# Patient Record
Sex: MALE | Race: WHITE | NOT HISPANIC OR LATINO | Employment: FULL TIME | URBAN - NONMETROPOLITAN AREA
[De-identification: names, ages, dates, MRNs, and addresses within clinical notes are randomized per-mention and may not be internally consistent; named-entity substitution may affect disease eponyms.]

---

## 2020-07-20 ENCOUNTER — TRANSCRIBE ORDERS (OUTPATIENT)
Dept: ORTHOPEDIC SURGERY | Facility: CLINIC | Age: 40
End: 2020-07-20

## 2020-07-20 DIAGNOSIS — G56.01 RIGHT CARPAL TUNNEL SYNDROME: Primary | ICD-10-CM

## 2020-07-20 DIAGNOSIS — S86.912A KNEE STRAIN, LEFT, INITIAL ENCOUNTER: Primary | ICD-10-CM

## 2020-07-22 ENCOUNTER — OFFICE VISIT (OUTPATIENT)
Dept: ORTHOPEDIC SURGERY | Facility: CLINIC | Age: 40
End: 2020-07-22

## 2020-07-22 VITALS
HEART RATE: 92 BPM | WEIGHT: 258 LBS | OXYGEN SATURATION: 98 % | DIASTOLIC BLOOD PRESSURE: 82 MMHG | HEIGHT: 69 IN | BODY MASS INDEX: 38.21 KG/M2 | TEMPERATURE: 98.5 F | SYSTOLIC BLOOD PRESSURE: 120 MMHG

## 2020-07-22 DIAGNOSIS — M25.531 RIGHT WRIST PAIN: Primary | ICD-10-CM

## 2020-07-22 DIAGNOSIS — G56.21 CUBITAL TUNNEL SYNDROME, RIGHT: ICD-10-CM

## 2020-07-22 DIAGNOSIS — M25.521 ELBOW PAIN, CHRONIC, RIGHT: ICD-10-CM

## 2020-07-22 DIAGNOSIS — G89.29 ELBOW PAIN, CHRONIC, RIGHT: ICD-10-CM

## 2020-07-22 PROCEDURE — 20605 DRAIN/INJ JOINT/BURSA W/O US: CPT | Performed by: ORTHOPAEDIC SURGERY

## 2020-07-22 PROCEDURE — 99204 OFFICE O/P NEW MOD 45 MIN: CPT | Performed by: ORTHOPAEDIC SURGERY

## 2020-07-22 RX ORDER — BUPIVACAINE HYDROCHLORIDE 5 MG/ML
0.5 INJECTION, SOLUTION PERINEURAL
Status: COMPLETED | OUTPATIENT
Start: 2020-07-22 | End: 2020-07-22

## 2020-07-22 RX ORDER — LISINOPRIL 10 MG/1
10 TABLET ORAL EVERY 24 HOURS
COMMUNITY
Start: 2020-02-04

## 2020-07-22 RX ORDER — LIDOCAINE HYDROCHLORIDE AND EPINEPHRINE 10; 10 MG/ML; UG/ML
0.5 INJECTION, SOLUTION INFILTRATION; PERINEURAL
Status: COMPLETED | OUTPATIENT
Start: 2020-07-22 | End: 2020-07-22

## 2020-07-22 RX ORDER — TRIAMCINOLONE ACETONIDE 40 MG/ML
40 INJECTION, SUSPENSION INTRA-ARTICULAR; INTRAMUSCULAR
Status: COMPLETED | OUTPATIENT
Start: 2020-07-22 | End: 2020-07-22

## 2020-07-22 RX ADMIN — LIDOCAINE HYDROCHLORIDE AND EPINEPHRINE 0.5 ML: 10; 10 INJECTION, SOLUTION INFILTRATION; PERINEURAL at 15:02

## 2020-07-22 RX ADMIN — BUPIVACAINE HYDROCHLORIDE 0.5 ML: 5 INJECTION, SOLUTION PERINEURAL at 15:02

## 2020-07-22 RX ADMIN — TRIAMCINOLONE ACETONIDE 40 MG: 40 INJECTION, SUSPENSION INTRA-ARTICULAR; INTRAMUSCULAR at 15:02

## 2020-07-22 NOTE — PROGRESS NOTES
Phil Toussaint is a 40 y.o. male   Primary provider:  Grayson Mccrary APRN       Chief Complaint   Patient presents with   • Right Wrist - Initial Evaluation, Pain       HISTORY OF PRESENT ILLNESS: Patient is being seen for his right wrist. Pain level of 4/10.    This is the first office visit for evaluation of right elbow pain and tingling in the right hand.    Mr. Toussaint is 40 years old and right-hand dominant.  He said he first had problems with his right elbow when he was playing the outfield in high school baseball.  He describes throwing and had an acute popping and snapping sensation in the elbow.  He was evaluated at the time and he thinks he tore a ligament in his elbow.  He said that 1 doctor he saw thought he needed Josh Walter surgery.  He immobilize his elbow for short time but then resumed his athletics against the advice of his physician.  It sounds like he has had intermittent problems with the elbow since then.  There is been no further trauma.  Complains of aching discomfort over the medial aspect of the elbow with occasional extension to the hand.  He has had intermittent numbness and tingling of the small ring and middle fingers.  Initially his symptoms were present only when throwing but now he says symptoms are fairly constant nature.  His pain is worse with motion of the elbow and use of the extremity.  He has had questionable relief with Tylenol.  He said he had an MRI scan of his elbow 5 years ago and there was some discussion about possible ulnar collateral ligament reconstruction.  However he was told he was too old to have the surgery done.  Recent treatment has consisted primarily of activity restriction.    Current medications include lisinopril.  He has no allergies.  He does not smoke.  Past medical history is remarkable for kidney stones.  He is employed as a .  He is .    JOLYNN Aleman has asked that I see him for evaluation and  "treatment.          Pain   The current episode started more than 1 year ago. Associated symptoms comments: Stabbing and burning .        CONCURRENT MEDICAL HISTORY:    Past Medical History:   Diagnosis Date   • Sleep apnea        No Known Allergies      Current Outpatient Medications:   •  lisinopril (PRINIVIL,ZESTRIL) 10 MG tablet, Take 10 mg by mouth Daily., Disp: , Rfl:     No past surgical history on file.    Family History   Problem Relation Age of Onset   • Hypertension Other    • Cancer Other         Social History     Socioeconomic History   • Marital status:      Spouse name: Not on file   • Number of children: Not on file   • Years of education: Not on file   • Highest education level: Not on file   Tobacco Use   • Smoking status: Never Smoker   • Smokeless tobacco: Never Used   Substance and Sexual Activity   • Alcohol use: Never     Frequency: Never   • Drug use: Never   • Sexual activity: Defer        Review of Systems   Constitutional: Negative.    HENT: Negative.    Eyes: Negative.    Respiratory: Negative.    Cardiovascular: Negative.    Gastrointestinal: Negative.    Endocrine: Negative.    Genitourinary: Negative.    Musculoskeletal: Negative.    Skin: Negative.    Allergic/Immunologic: Negative.    Neurological: Negative.    Hematological: Negative.    Psychiatric/Behavioral: Negative.      ReView of systems is positive as noted above.  PHYSICAL EXAMINATION:   In general he is healthy-appearing alert pleasant and in no apparent distress.  He responds appropriately to questions and commands.    Vitals:    07/22/20 1407   BP: 120/82   BP Location: Left arm   Patient Position: Sitting   Cuff Size: Adult   Pulse: 92   Temp: 98.5 °F (36.9 °C)   TempSrc: Temporal   SpO2: 98%   Weight: 117 kg (258 lb)   Height: 175.3 cm (69\")   PainSc:   4       Physical Exam    GAIT:     [x]  Normal  []  Antalgic    Assistive device: [x]  None  []  Walker     []  Crutches  []  Cane     []  Wheelchair  []  " Stretcher    Ortho Exam is directed to the upper extremities.  There was no clinical atrophy in the upper extremities.  There are multiple tattoos.  Digital wrist and forearm motions are full on the right.  Motion of the right elbow is smooth but painfully restricted from about 5 to 100 degrees.  Percussion over the ulnar nerve at the elbow produces mild pain but no paresthesias.  There is tenderness diffusely about the elbow most prominent over the medial aspect of the proximal ulna as well as over the olecranon.  There is no palpable abnormality.  He has symmetric laxity on valgus stressing of the elbow.  Radial pulses strong.  Sensory exam shows hypaesthesia soft touch in the small finger and dorsal ulnar aspect of the hand.  There was no split in the ring finger.  Vibratory sensibility is also decreased in the small finger.  Pressure Phalen testing is negative.  Flexion of the elbow produces medial elbow pain but no paresthesias.    MRI scan of the elbow done previously was reviewed.  The study is technically limited.  There is a small area of signal change in the radial head.  The articular cartilage appears normal.  The ulnar collateral ligament appears to be intact.    EMG and nerve conduction study done earlier this month this report is showing mild right carpal tunnel syndrome with normal ulnar nerve function and no evidence of cervical radiculopathy or brachial plexopathy.          Xr Elbow 2 View Right    Result Date: 7/22/2020  Narrative: Ordering Provider:  João Yanes MD Ordering Diagnosis/Indication:  Right wrist pain, Elbow pain, chronic, right Procedure:  XR ELBOW 2 VW RIGHT Exam Date:  7/22/20 COMPARISON: None     Impression:  Radiographs of the right elbow AP and lateral views done today show no significant degenerative change.  The lateral view is technically limited. João Yanes MD 7/22/20      Medium Joint Arthrocentesis: R elbow  Date/Time: 7/22/2020 3:02 PM  Consent given by:  patient  Site marked: site marked  Timeout: Immediately prior to procedure a time out was called to verify the correct patient, procedure, equipment, support staff and site/side marked as required   Supporting Documentation  Indications: pain and joint swelling   Procedure Details  Location: elbow - R elbow  Preparation: Patient was prepped and draped in the usual sterile fashion  Needle size: 22 G  Approach: anterolateral  Medications administered: 0.5 mL lidocaine-EPINEPHrine 1 %-1:480179; 40 mg triamcinolone acetonide 40 MG/ML; 0.5 mL bupivacaine 0.5 %  Patient tolerance: patient tolerated the procedure well with no immediate complications          ASSESSMENT: Pain right elbow and hand of uncertain cause.  His symptoms and exam are suggestive of cubital tunnel syndrome but he has limited clinical findings.  Exam is not consistent with carpal tunnel syndrome.  It is possible that his pain may be related to chronic injury to the elbow possibly with chondral pathology.    The uncertainty of the diagnosis was discussed with him.  Treatment options were reviewed.  The potential benefits of injection of the cubital tunnel were discussed.  He wished to proceed with this.    The right elbow was prepped.  Skin was infiltrated with 1% Xylocaine with epinephrine.  The cubital tunnel was injected just proximal to the medial epicondyle with Xylocaine and Kenalog.  There were no complications.    He may advance activities as tolerated.  I requested follow-up here in 1 month for clinical exam.  If his symptoms do not respond to the above measures injection of the elbow joint may be an option.        Diagnoses and all orders for this visit:    Right wrist pain  -     XR Elbow 2 View Right  -     Medium Joint Arthrocentesis: R elbow    Elbow pain, chronic, right  -     XR Elbow 2 View Right  -     Medium Joint Arthrocentesis: R elbow    Cubital tunnel syndrome, right    Other orders  -     lisinopril (PRINIVIL,ZESTRIL) 10 MG  tablet; Take 10 mg by mouth Daily.          PLAN  Body mass index is 38.1 kg/m².    Return in about 4 weeks (around 8/19/2020).    João Yanes MD

## 2020-08-05 ENCOUNTER — OFFICE VISIT (OUTPATIENT)
Dept: ORTHOPEDIC SURGERY | Facility: CLINIC | Age: 40
End: 2020-08-05

## 2020-08-05 VITALS — WEIGHT: 258 LBS | BODY MASS INDEX: 38.21 KG/M2 | HEIGHT: 69 IN

## 2020-08-05 DIAGNOSIS — M25.321 INSTABILITY OF RIGHT ELBOW JOINT: Primary | ICD-10-CM

## 2020-08-05 DIAGNOSIS — G56.21 CUBITAL TUNNEL SYNDROME ON RIGHT: ICD-10-CM

## 2020-08-05 PROBLEM — M79.641 RIGHT HAND PAIN: Status: ACTIVE | Noted: 2020-08-05

## 2020-08-05 PROCEDURE — 99214 OFFICE O/P EST MOD 30 MIN: CPT | Performed by: ORTHOPAEDIC SURGERY

## 2020-08-05 NOTE — PROGRESS NOTES
"Phil Toussaint is a 40 y.o. male returns for     Chief Complaint   Patient presents with   • Right Hand - Follow-up   • Right Elbow - Follow-up       HISTORY OF PRESENT ILLNESS: Patient is here today for right elbow/wrist pain.  He is also having numbness radiating down to his ulnar hand seems to be worse with activity and better with rest.  This started 20 years ago and injury when he was a senior in high school.  Has had persistent problems since then and really feels that it never got well several years after the injury where he felt a pop in the medial aspect of his elbow he started developing numbness radiating down to his third fourth and fifth finger.  It is recently gotten worse and he sought treatment.  In the past he has seen an elbow surgeon who that once talked about surgical intervention but then he was told that he was \"too old.\"  He has had recent MRI scan that is reported negative with only some tearing of the common extensor laterally.  Recent EMG showed mild carpal tunnel but nothing going on with the ulnar nerve.  He was seen by Dr. Yanes a few weeks ago injected the ulnar cubital tunnel and says he got relief only for 1 evening after that.  He is now dropping things he works as a  and this is his good hand and is unable to  his grandchildren.  Patient states that his pain is 8/10.       CONCURRENT MEDICAL HISTORY:    Past Medical History:   Diagnosis Date   • Sleep apnea        No Known Allergies      Current Outpatient Medications:   •  lisinopril (PRINIVIL,ZESTRIL) 10 MG tablet, Take 10 mg by mouth Daily., Disp: , Rfl:     No past surgical history on file.        ROS: Review of systems has been updated as of today's date.  All other systems are negative except as noted previously.    PHYSICAL EXAMINATION:       Ht 175.3 cm (69\")   Wt 117 kg (258 lb)   BMI 38.10 kg/m²     Physical Exam   Constitutional: He is oriented to person, place, and time. He appears well-developed. "   HENT:   Head: Normocephalic and atraumatic.   Eyes: Pupils are equal, round, and reactive to light. EOM are normal.   Neck: Neck supple. No tracheal deviation present.   Pulmonary/Chest: Effort normal.   Musculoskeletal: He exhibits tenderness. He exhibits no edema or deformity.   Neurological: He is alert and oriented to person, place, and time. A sensory deficit is present.   Skin: Skin is warm and dry. No erythema.   Psychiatric: He has a normal mood and affect.       GAIT:     []  Normal  []  Antalgic    Assistive device: [x]  None  []  Walker     []  Crutches  []  Cane     []  Wheelchair  []  Stretcher    Ortho Exam  He is tender over the medial epicondyle and just distal for this.  He has pain with valgus stressing of his elbow.  I do not really detect any difference between his right and left elbow in terms of the stressing.  Motion is reasonably reasonably well-maintained.  Almost feel a palpable subluxation of the ulnar nerve on the right side when he hyper-flexes it is not really apparent on the left side.  He has numbness down the ulnar nerve distribution there is no atrophy strength testing is intact.  Good motion of the shoulder the wrist is not particularly painful or tender.    Xr Elbow 2 View Right    Result Date: 7/22/2020  Narrative: Ordering Provider:  João Yanes MD Ordering Diagnosis/Indication:  Right wrist pain, Elbow pain, chronic, right Procedure:  XR ELBOW 2 VW RIGHT Exam Date:  7/22/20 COMPARISON: None     Impression:  Radiographs of the right elbow AP and lateral views done today show no significant degenerative change.  The lateral view is technically limited. João Yanes MD 7/22/20  X-rays are reviewed and agree with this reading.  Have also reviewed his MRI scan which he brings with him.  Cuts 11 and 12 on the coronal views of his elbow in the medial area do show his ulnar collateral ligament.  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          ASSESSMENT:    Diagnoses and all orders for this visit:    Instability of right elbow joint  -     Ambulatory Referral to Orthopedic Surgery    Cubital tunnel syndrome on right          PLAN this is a longstanding injury that came up with some numbness.  He was told and seen by a surgeon at one point felt he needed surgery on the medial side and they decided not to.  My suspicion is that he may indeed had an old standing medial collateral injury perhaps this is causing him some pain even though is been quite a long time.  This is been going on for 20 years.  I think he also has some ulnar nerve symptoms he may be subluxing and have stretching a little bit with his medial collateral injury which is possible.  I believe there are 2 things going on here and despite this we have evidence to suggest that the MRI scan is normal as are his electrodiagnostics.  I have explained to my opinion that many times electrodiagnostics may be negative with ulnar nerve subluxation and he can even have a false negative test.  Likewise we may not see the ulnar collateral instability since it is chronic and old.  I think the best at this point is to get him to see Dr. Toro Hough, upper extremity surgeon at [facility].  I think he is certainly having problems that are debilitating to him and he wants to get something done.  It may be a staged procedure as I think dropping things and the numbness in his arm are nerve related but picking things up and pain in the elbow when lifting is probably related to his instability.  We will arrange for this and have given him back his CD of his MRI scan as well as a copy of his EMGs to take with him to his appointment.    No follow-ups on file.      This document has been electronically signed by Daniel Meraz MD on August 5, 2020 19:42

## 2020-10-20 ENCOUNTER — TRANSCRIBE ORDERS (OUTPATIENT)
Dept: GENERAL RADIOLOGY | Facility: CLINIC | Age: 40
End: 2020-10-20

## 2020-10-20 DIAGNOSIS — R10.11 RUQ PAIN: Primary | ICD-10-CM
